# Patient Record
Sex: MALE | Race: WHITE | Employment: STUDENT | URBAN - METROPOLITAN AREA
[De-identification: names, ages, dates, MRNs, and addresses within clinical notes are randomized per-mention and may not be internally consistent; named-entity substitution may affect disease eponyms.]

---
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
## 2024-12-31 ENCOUNTER — OFFICE VISIT (OUTPATIENT)
Dept: URGENT CARE | Age: 24
End: 2024-12-31

## 2024-12-31 VITALS
DIASTOLIC BLOOD PRESSURE: 78 MMHG | RESPIRATION RATE: 16 BRPM | OXYGEN SATURATION: 99 % | SYSTOLIC BLOOD PRESSURE: 126 MMHG | WEIGHT: 205.4 LBS | TEMPERATURE: 98.5 F | HEART RATE: 72 BPM

## 2024-12-31 DIAGNOSIS — R09.82 POSTNASAL DRIP: ICD-10-CM

## 2024-12-31 DIAGNOSIS — R09.81 NASAL CONGESTION: ICD-10-CM

## 2024-12-31 DIAGNOSIS — J06.9 VIRAL URI: Primary | ICD-10-CM

## 2024-12-31 DIAGNOSIS — R05.1 ACUTE COUGH: ICD-10-CM

## 2024-12-31 PROBLEM — M67.51 PLICA SYNDROME OF RIGHT KNEE: Status: ACTIVE | Noted: 2022-03-16

## 2024-12-31 PROBLEM — H93.13 TINNITUS OF BOTH EARS: Status: ACTIVE | Noted: 2024-12-31

## 2024-12-31 PROBLEM — M25.561 PAIN IN RIGHT KNEE: Status: RESOLVED | Noted: 2024-12-31 | Resolved: 2024-12-31

## 2024-12-31 PROBLEM — F90.9 ATTENTION DEFICIT HYPERACTIVITY DISORDER (ADHD): Status: ACTIVE | Noted: 2024-12-31

## 2024-12-31 PROBLEM — M25.561 BILATERAL KNEE PAIN: Status: RESOLVED | Noted: 2020-10-05 | Resolved: 2024-12-31

## 2024-12-31 PROBLEM — M22.2X2 BILATERAL PATELLOFEMORAL SYNDROME: Status: ACTIVE | Noted: 2020-10-05

## 2024-12-31 PROBLEM — M25.562 BILATERAL KNEE PAIN: Status: RESOLVED | Noted: 2020-10-05 | Resolved: 2024-12-31

## 2024-12-31 PROBLEM — Z65.8 OTHER SPECIFIED PROBLEMS RELATED TO PSYCHOSOCIAL CIRCUMSTANCES: Status: ACTIVE | Noted: 2024-12-31

## 2024-12-31 PROBLEM — M22.2X1 BILATERAL PATELLOFEMORAL SYNDROME: Status: ACTIVE | Noted: 2020-10-05

## 2024-12-31 ASSESSMENT — ENCOUNTER SYMPTOMS
SHORTNESS OF BREATH: 1
SORE THROAT: 1
COUGH: 1
WHEEZING: 0
CHEST TIGHTNESS: 0
DIARRHEA: 0
VOICE CHANGE: 0
VOMITING: 0
RHINORRHEA: 1
NAUSEA: 0
ABDOMINAL PAIN: 0

## 2024-12-31 ASSESSMENT — VISUAL ACUITY: OU: 1

## 2024-12-31 NOTE — PROGRESS NOTES
focal deficit present.      Mental Status: He is alert and oriented to person, place, and time.   Psychiatric:         Behavior: Behavior is cooperative.       PROCEDURES:  Unless otherwise noted below, none     Procedures    RESULTS:  No results found for this visit on 12/31/24.  An electronic signature was used to authenticate this note.    --Jose Crowder PA-C

## 2024-12-31 NOTE — PATIENT INSTRUCTIONS
Capmist prescribed for cough and congestion relief with expectorant therapy  Do not take other decongestants, Mucinex, or cough medications while on this medication.    Recommend OTC treatment for symptoms:  ibuprofen (Advil, Motrin) and acetaminophen (Tylenol) for fevers and pain relief.  decongestants (specifically pseudoephedrine - found behind the pharmacy counter - does not need a prescription) <avoid if you have a history of high blood pressure or heart conditions>, along with antihistamines (Claritin, Zyrtec, Allegra, or Xyzal) and nasal steroid sprays (such as Flonase) to help with nasal congestion and runny nose.  guaifenesin (Mucinex) can help with thinning out mucus which can help with chest congestion or with relieving persistent sinus pressure  throat sprays (Cepacol, chloraseptic) for throat pain.  dextromethorphan (Robitussin, Delsym), throat lozenges, and increased water intake for cough.  honey (1-2 teaspoons every hour) for relief of throat irritation and coughing fits.  warm teas, humidifiers, nasal lavages, and sleeping in an inclined position are also helpful options that can lessen symptoms.  Recommend warm compresses over the symptomatic ear(s) for 10-15 minutes, or a hot shower, followed by 1-2 minutes of massaging the area behind your ears and down the jaw-line to help with the ear congestion/ear pressure.    Follow up with your PCP within 5 days or, if unable, you can return to the clinic if symptoms persist beyond 5 days or if symptoms worsen.    If you develop shortness of breath, increased work of breathing, lightheadedness, or chest pain, contact 911, or follow up immediately with the nearest Emergency Department for further evaluation.    New Prescriptions    PSEUDOEPHEDRINE-DM-GG 60- MG TABS    Take 1 tablet by mouth 4 times daily as needed (cough and congestion)